# Patient Record
(demographics unavailable — no encounter records)

---

## 2024-12-20 NOTE — HISTORY OF PRESENT ILLNESS
[FreeTextEntry1] : 51 year old female with PMH as listed below presents today for an initial evaluation for OP  Recent DEXA 08/2024: L spine: -0.4 Left femoral neck: -2.5 Left total: -1.9 Right femoral neck: -2.2 Right total: -1.6

## 2025-07-02 NOTE — PHYSICAL EXAM
[MA] : MA [Chaperoned Physical Exam] : A chaperone was present in the examining room during all aspects of the physical examination. [Appropriately responsive] : appropriately responsive [Alert] : alert [No Acute Distress] : no acute distress [No Lymphadenopathy] : no lymphadenopathy [Regular Rate Rhythm] : regular rate rhythm [No Murmurs] : no murmurs [Clear to Auscultation B/L] : clear to auscultation bilaterally [Soft] : soft [Non-tender] : non-tender [Non-distended] : non-distended [No HSM] : No HSM [No Lesions] : no lesions [No Mass] : no mass [Oriented x3] : oriented x3 [Examination Of The Breasts] : a normal appearance [Breast Palpation Diffuse Fibrous Tissue Bilateral] : fibrocystic changes [No Masses] : no breast masses were palpable [Labia Majora] : normal [Labia Minora] : normal [No Bleeding] : There was no active vaginal bleeding [Normal] : normal [Anteversion] : anteverted [Uterine Adnexae] : normal [Declined] : Patient declined rectal exam [FreeTextEntry2] : dilip [FreeTextEntry6] : Symmetrical, no masses nontender [FreeTextEntry8] : No masses nontender

## 2025-07-02 NOTE — PLAN
[FreeTextEntry1] : Pap smear completed patient will have mammogram bilateral breast sonogram continue vitamin D3 multivitamin over-the-counter moisturizer for dryness and will return here in 12 months

## 2025-07-02 NOTE — HISTORY OF PRESENT ILLNESS
[Patient reported mammogram was normal] : Patient reported mammogram was normal [Patient reported breast sonogram was normal] : Patient reported breast sonogram was normal [Patient reported PAP Smear was normal] : Patient reported PAP Smear was normal [Patient reported bone density results were abnormal] : Patient reported bone density results were abnormal [FreeTextEntry1] : 52-year-old LMP 2022 presents for annual examination.  Patient denies hot flashes sweats or other vasomotor symptoms.  She does have some mild vaginal dryness and history of osteoporosis on previous bone density.  Patient had Cologuard in 2023 which was normal this is being followed by PMD.  She denies vaginal bleeding or discharge or other GYN symptoms. [TextBox_4] : pt here for annual exam LMP 2022 [Mammogramdate] : 9/3/24 [TextBox_19] : br1 [BreastSonogramDate] : 9/3/24 [TextBox_25] : br2 [PapSmeardate] : 6/21/24 [TextBox_31] : wnl [BoneDensityDate] : 8/29/24 [TextBox_37] : osteoporosis [LMPDate] : 2022 [PGHxTotal] : 2 [[facility] : 2 [[facility]] : 2